# Patient Record
Sex: FEMALE | Race: WHITE
[De-identification: names, ages, dates, MRNs, and addresses within clinical notes are randomized per-mention and may not be internally consistent; named-entity substitution may affect disease eponyms.]

---

## 2018-05-06 ENCOUNTER — HOSPITAL ENCOUNTER (EMERGENCY)
Dept: HOSPITAL 25 - UCCORT | Age: 28
Discharge: HOME | End: 2018-05-06
Payer: COMMERCIAL

## 2018-05-06 VITALS — SYSTOLIC BLOOD PRESSURE: 101 MMHG | DIASTOLIC BLOOD PRESSURE: 73 MMHG

## 2018-05-06 DIAGNOSIS — B95.62: ICD-10-CM

## 2018-05-06 DIAGNOSIS — L02.411: ICD-10-CM

## 2018-05-06 DIAGNOSIS — F32.9: ICD-10-CM

## 2018-05-06 DIAGNOSIS — F17.210: Primary | ICD-10-CM

## 2018-05-06 DIAGNOSIS — L03.111: ICD-10-CM

## 2018-05-06 PROCEDURE — G0463 HOSPITAL OUTPT CLINIC VISIT: HCPCS

## 2018-05-06 PROCEDURE — 87205 SMEAR GRAM STAIN: CPT

## 2018-05-06 PROCEDURE — 87640 STAPH A DNA AMP PROBE: CPT

## 2018-05-06 PROCEDURE — 87077 CULTURE AEROBIC IDENTIFY: CPT

## 2018-05-06 PROCEDURE — 10060 I&D ABSCESS SIMPLE/SINGLE: CPT

## 2018-05-06 PROCEDURE — 87070 CULTURE OTHR SPECIMN AEROBIC: CPT

## 2018-05-06 PROCEDURE — 87641 MR-STAPH DNA AMP PROBE: CPT

## 2018-05-06 PROCEDURE — 87186 SC STD MICRODIL/AGAR DIL: CPT

## 2018-05-06 PROCEDURE — 99212 OFFICE O/P EST SF 10 MIN: CPT

## 2018-05-06 NOTE — UC
UC General HPI





- HPI Summary


HPI Summary: 





pt c/o cyst to r armpit x 1 week with worsening. she poked it . it is 

worsening. no hx same of MRSA. denies fever.





- History of Current Complaint


Chief Complaint: UCGeneralIllness


Stated Complaint: skin complaint


Time Seen by Provider: 05/06/18 14:30


Hx Obtained From: Patient


Hx Last Menstrual Period: has IUD/Mariah


Onset/Duration: Gradual Onset


Timing: Constant


Pain Intensity: 7


Aggravating: nothing


Alleviating: nothing


Associated Signs & Symptoms: Negative: Fever





- Allergy/Home Medications


Allergies/Adverse Reactions: 


 Allergies











Allergy/AdvReac Type Severity Reaction Status Date / Time


 


No Known Allergies Allergy   Verified 05/06/18 14:30











Home Medications: 


 Home Medications





FLUoxetine* [PROzac*] 20 mg PO DAILY 05/06/18 [History Confirmed 05/06/18]











PMH/Surg Hx/FS Hx/Imm Hx


Respiratory History: Asthma


Psychological History: Depression





- Surgical History


Surgical History: Yes


Surgery Procedure, Year, and Place: tonsillectomy.  wisdom teeth removal





- Family History


Known Family History: Positive: None





- Social History


Occupation: Employed Full-time


Lives: With Family


Alcohol Use: None


Alcohol Amount: 1-2 times a week


Substance Use Type: None


Smoking Status (MU): Current Some Day Smoker


Type: Cigarettes





- Immunization History


Vaccination Up to Date: Yes





Review of Systems


Constitutional: Negative


Skin: Rash - swelling R axilla


Eyes: Negative


ENT: Negative


Respiratory: Negative


Cardiovascular: Negative


Gastrointestinal: Negative


Genitourinary: Negative


Motor: Negative


Neurovascular: Negative


Musculoskeletal: Negative


Neurological: Negative


Psychological: Negative


Is Patient Immunocompromised?: No


All Other Systems Reviewed And Are Negative: Yes





Physical Exam


Triage Information Reviewed: Yes


Appearance: Well-Appearing


Vital Signs: 


 Initial Vital Signs











Temp  98.5 F   05/06/18 14:26


 


Pulse  109   05/06/18 14:26


 


Resp  18   05/06/18 14:26


 


BP  101/73   05/06/18 14:26


 


Pulse Ox  99   05/06/18 14:26











Vital Signs Reviewed: Yes


Eyes: Positive: Conjunctiva Clear


ENT: Positive: Normal ENT inspection


Neck: Positive: Supple, Nontender, No Lymphadenopathy


Respiratory: Positive: Lungs clear, Normal breath sounds


Cardiovascular: Positive: RRR, No Murmur


Abdomen Description: Positive: Nontender, No Organomegaly, Soft


Bowel Sounds: Positive: Present


Musculoskeletal: Positive: ROM Intact


Neurological: Positive: Alert


Psychological: Positive: Age Appropriate Behavior


Skin Exam: Normal


Skin: Positive: Other - R axilla with erythema, swelling and central 

fluctuance. erythema extends to the arm. Area  is tender





Procedures





- Procedure Summary


Procedure Summary: 





time out done. site prep betadine. linear local with 1% lidocaine, 2ml. tip #11 

marion used to make superficial stab and incision, puss drained and cultured. 

blunt forced used to explore site/irrigated sterile water. moderated puss 

drained. plain pack placed thenocovered with sterile gauze and held with paper 

tape. sterile technique used. minimal bleeding. pt tolerated well. cellulitis 

marked.





Course/Dx





- Course


Course Of Treatment: abscess with cellulitis. will cover with Bactrim. need for 

close f/u strussed.





- Differential Dx - Multi-Symptom


Provider Diagnoses: abscess r axilla, cellulitis R axilla





Discharge





- Sign-Out/Discharge


Documenting (check all that apply): Discharge/Admit/Transfer





- Discharge Plan


Condition: Stable


Disposition: HOME


Prescriptions: 


Sulfamethox/Trimethoprim DS* [Bactrim /160 TAB*] 1 tab PO BID #20 tab


Patient Education Materials:  Cellulitis (ED), Abscess Incision and Drainage (DC

)


Referrals: 


Jennifer Rivas [Primary Care Provider] - 2 Days





- Billing Disposition and Condition


Condition: STABLE


Disposition: HOME